# Patient Record
Sex: FEMALE
[De-identification: names, ages, dates, MRNs, and addresses within clinical notes are randomized per-mention and may not be internally consistent; named-entity substitution may affect disease eponyms.]

---

## 2017-08-08 PROBLEM — Z00.00 ENCOUNTER FOR PREVENTIVE HEALTH EXAMINATION: Status: ACTIVE | Noted: 2017-08-08

## 2019-04-01 ENCOUNTER — LABORATORY RESULT (OUTPATIENT)
Age: 44
End: 2019-04-01

## 2019-04-01 ENCOUNTER — APPOINTMENT (OUTPATIENT)
Dept: SURGERY | Facility: CLINIC | Age: 44
End: 2019-04-01
Payer: COMMERCIAL

## 2019-04-01 VITALS
TEMPERATURE: 99.4 F | WEIGHT: 132 LBS | OXYGEN SATURATION: 99 % | BODY MASS INDEX: 24.92 KG/M2 | HEART RATE: 92 BPM | HEIGHT: 61 IN | DIASTOLIC BLOOD PRESSURE: 68 MMHG | SYSTOLIC BLOOD PRESSURE: 101 MMHG

## 2019-04-01 DIAGNOSIS — L72.3 SEBACEOUS CYST: ICD-10-CM

## 2019-04-01 PROCEDURE — 99243 OFF/OP CNSLTJ NEW/EST LOW 30: CPT | Mod: 25

## 2019-04-01 PROCEDURE — 11401 EXC TR-EXT B9+MARG 0.6-1 CM: CPT

## 2019-04-01 PROCEDURE — 12031 INTMD RPR S/A/T/EXT 2.5 CM/<: CPT

## 2019-04-01 NOTE — REVIEW OF SYSTEMS
[Fever] : no fever [Chills] : no chills [Eyesight Problems] : no eyesight problems [Nosebleeds] : no nosebleeds [Chest Pain] : no chest pain [Shortness Of Breath] : no shortness of breath [Wheezing] : no wheezing [Cough] : no cough [Abdominal Pain] : no abdominal pain [Arthralgias] : no arthralgias [Joint Swelling] : no joint swelling [Joint Stiffness] : no joint stiffness [Dizziness] : no dizziness [Anxiety] : no anxiety [Muscle Weakness] : no muscle weakness [Swollen Glands] : no swollen glands [de-identified] : has multiple nodules to axilla, bilaterally. no tenderness or pain

## 2019-04-01 NOTE — CONSULT LETTER
[Dear  ___] : Dear  [unfilled], [Consult Closing:] : Thank you very much for allowing me to participate in the care of this patient.  If you have any questions, please do not hesitate to contact me. [Sincerely,] : Sincerely, [FreeTextEntry3] : Abram Riggs MD\par

## 2019-04-01 NOTE — HISTORY OF PRESENT ILLNESS
[de-identified] : 43 y.o F presents w the cc of having a nodules to both underarms. Patient states she has felt them there for many years, but recently\par she has noticed that they have gotten bigger in size. Patient denies pain to the area, she states she is asymptomatic, however, has\par discomfort with shaving to the area. \par  \par

## 2019-04-01 NOTE — REASON FOR VISIT
[Consultation] : a consultation visit [FreeTextEntry1] : has a cyst to the axillary region, bilaterally

## 2019-04-01 NOTE — PLAN
[FreeTextEntry1] : patient would like to have the biggest cyst removed; L. axilla, 6 mm in size\par Procedure\par \par Preoperative diagnosis : L. axillary cyst \par Post operative diagnosis : same\par Procedure : excision left axillary cyst\par \par The area was cleaned and prepped. Local anaesthesia 1% lidocaine with epinephrine was instilled in the skin and surrounding tissue The skin was incised with the scalpel and the mass was dissected out completely and excised. The wound was closed in layers. Tolerated the procedure. Post op instructions given\par Specimen sent for pathology\par \par

## 2019-04-01 NOTE — PHYSICAL EXAM
[Normal Breath Sounds] : Normal breath sounds [Normal Rate and Rhythm] : normal rate and rhythm [Alert] : alert [Oriented to Person] : oriented to person [Oriented to Place] : oriented to place [Oriented to Time] : oriented to time [Calm] : calm [JVD] : no jugular venous distention  [Abdominal Masses] : No abdominal masses [Abdomen Tenderness] : ~T ~M No abdominal tenderness [de-identified] : A/Ox3; NAD. appears comfortable  [de-identified] : EOMI [de-identified] : no joint swelling/deformity  [de-identified] : has multiple cysts to the axillary region, bilaterally. no infection noted

## 2019-04-09 ENCOUNTER — APPOINTMENT (OUTPATIENT)
Dept: SURGERY | Facility: CLINIC | Age: 44
End: 2019-04-09
Payer: COMMERCIAL

## 2019-04-09 PROCEDURE — 99024 POSTOP FOLLOW-UP VISIT: CPT

## 2019-04-09 NOTE — PLAN
[FreeTextEntry1] : patient is doing well, without reported complaints; \par wound healing well, no infection\par path results discussed w the patient and copy of the report was given\par F/U prn or if there are any problems\par

## 2019-04-09 NOTE — CONSULT LETTER
[Dear  ___] : Dear  [unfilled], [Consult Letter:] : I had the pleasure of evaluating your patient, [unfilled]. [Consult Closing:] : Thank you very much for allowing me to participate in the care of this patient.  If you have any questions, please do not hesitate to contact me. [Sincerely,] : Sincerely, [FreeTextEntry3] : Abram Riggs MD\par

## 2019-04-09 NOTE — HISTORY OF PRESENT ILLNESS
[de-identified] : 43 y.o F, presents for f/u visit, she is s/p excision of L. axillary cyst in the office, 04/1/19. \par Path results c/w vellus hair cyst, ruptured/inflamed. \par Patient without reported complaints, she denies having any pain.

## 2021-04-01 ENCOUNTER — RESULT REVIEW (OUTPATIENT)
Age: 46
End: 2021-04-01

## 2023-08-21 ENCOUNTER — NON-APPOINTMENT (OUTPATIENT)
Age: 48
End: 2023-08-21

## 2023-12-12 ENCOUNTER — NON-APPOINTMENT (OUTPATIENT)
Age: 48
End: 2023-12-12

## 2025-09-08 ENCOUNTER — NON-APPOINTMENT (OUTPATIENT)
Age: 50
End: 2025-09-08